# Patient Record
(demographics unavailable — no encounter records)

---

## 2020-04-15 NOTE — CR
Chest: Portable view of the chest was obtained.

 

Comparison: No previous chest imaging is available.

 

Heart size and mediastinum are normal.  Lungs are clear with no acute 

parenchymal change.  Bony structures are unremarkable.

 

Impression:

1.  Nothing acute is appreciated on portable chest x-ray.

 

Diagnostic code #1

 

Study was dictated in MDT

## 2020-04-15 NOTE — EDM.PDOC
ED [facility] GENERAL MEDICAL PROBLEM





- General


Chief Complaint: Allergic Reaction


Stated Complaint: ALLERGIC REACTION


Time Seen by Provider: 04/15/20 18:30


Source of Information: Reports: Patient


History Limitations: Reports: No Limitations





- History of Present Illness


INITIAL COMMENTS - FREE TEXT/NARRATIVE: 


Patient is a 23-year-old female with past medical history of asthma and 

allergies presenting with a chief complaint of scratchy throat and chest 

tightness skin and itchiness.  Patient states his symptoms started around 8:00 

this morning after she was getting her allergy shots.  Patient states she has 

felt the symptoms in the past when getting allergy shots but states they are 

worse today.  She states she took Benadryl with little relief.  Patient no 

longer is having any skin itchiness but still feels some scratchiness in her 

throat.  Patient denies any fevers.  Patient states does not feel like her 

asthma exacerbation.  Patient denies any travels anywhere recently.  Patient 

has no sick contacts.  Patient reports having coronavirus testing previously 

and testing negative.  Patient reports history of recurrent pneumonia and 

bronchitis.





Pmhx: Asthma


Pshx: None


Family Hx: noncontributory 





Smoking history? no 


Etoh use? none


Drug use? none





In addition to that documented in the HPI above, the additional ROS was obtained

:


Constitutional: Denies fevers or chills


Eyes: Denies vision changes


ENMT: Per HPI


CV: Denies chest pain


Resp: Per HPI


GI: Denies vomiting or diarrhea


: Denies painful urination


MSK: Denies recent trauma


Skin: Denies new rashes


Neuro: Denies new numbness or tingling or weakness


Endocrine: Denies unexpected weight loss


Heme: Denies bleeding disorders





I have reviewed the triage vital signs


Const: Comfortable in appearance, nontoxic well nourished, well developed, 

appears stated age


Eyes: PERRL, no conjunctival injection


HENT: Normal oropharynx.  No swelling of the uvula. NCAT, Neck supple without 

meningismus .  No stridor


CV: RRR, Warm, well-perfused extremities


RESP: CTAB, Unlabored respiratory effort.  No wheezes noted


GI: soft, non-tender, non-distended, no masses


MSK: No gross deformities appreciated


Skin: Warm, dry. No rashes


Neuro: Alert, CNs II-XII grossly intact. Sensation and motor function of 

extremities grossly intact.


Psych: Appropriate mood and affect





Assessment and plan:


Patient is 23-year-old female presenting with chief complaint of allergic 

reaction.  Patient has no evidence of anaphylaxis based on history or 

examination.  Patient is well-appearing and had episode of respiratory 

distress.  Chest x-ray is within normal limits.  Patient is vitally stable.  

Patient given steroids for symptom relief.  No evidence of streptococcal 

pharyngitis or retropharyngeal abscess. Patient given return precautions all 

questions were asked and answered.  Patient agrees with plan.








- Related Data


 Allergies











Allergy/AdvReac Type Severity Reaction Status Date / Time


 


sulfamethoxazole Allergy Severe Redness Verified 10/19/14 11:39 MDT





[From Septra]     


 


trimethoprim [From Septra] Allergy Severe Redness Verified 10/19/14 11:39 MDT


 


horses Allergy Severe Airway Uncoded 10/19/14 11:39 MDT





   Tightness  











Home Meds: 


 Home Meds





Albuterol Sulfate [Albuterol Sulfate Hfa] 8.5 gm IH Q6HR PRN 04/15/20 [History]


Amoxicillin/Potassium Clav [Augmentin 875-125 Tablet]  04/15/20 [History]


Birth Control   04/15/20 [History]


Budesonide/Formoterol Fumarate [Symbicort 160-4.5 Mcg Inhaler] 1 puff IN BID 04/

15/20 [History]


Montelukast [Singulair] 10 mg PO DAILY 04/15/20 [History]











ED ROS ALLERGIC REACTION





- Review of Systems


Review Of Systems: See Below





ED EXAM GENERAL NO PERIP PULSE





- Physical Exam


Exam: See Below





Course





- Vital Signs


Last Recorded V/S: 


 Last Vital Signs











Temp  36.2 C   04/15/20 18:20


 


Pulse  100   04/15/20 18:20


 


Resp  18   04/15/20 18:20


 


BP  132/89   04/15/20 18:20


 


Pulse Ox  99   04/15/20 18:20














- Orders/Labs/Meds


Orders: 


 Active Orders 24 hr











 Category Date Time Status


 


 Chest 1V Frontal [CR] Stat Exams  04/15/20 18:26 Ordered











Meds: 


Medications














Discontinued Medications














Generic Name Dose Route Start Last Admin





  Trade Name Freq  PRN Reason Stop Dose Admin


 


Dexamethasone  6 mg  04/15/20 18:28  





  Dexamethasone  IM  04/15/20 18:29  





  ONETIME ONE   





     





     





     





     


 


Ranitidine HCl  150 mg  04/15/20 18:29  





  Zantac  PO  04/15/20 18:30  





  ONETIME ONE   





     





     





     





     














Departure





- Departure


Time of Disposition: 18:46


Disposition: Home, Self-Care 01


Clinical Impression: 


 Allergic reaction








- Discharge Information


Instructions:  Allergies, Adult


Referrals: 


PCP,None [Primary Care Provider] - 


Additional Instructions: 


The following information is given to patients seen in the emergency department 

who are being discharged to home. This information is to outline your options 

for follow-up care. We provide all patients seen in our emergency department 

with a follow-up referral.





The need for follow-up, as well as the timing and circumstances, are variable 

depending upon the specifics of your emergency department visit.





If you don't have a primary care physician on staff, we will provide you with a 

referral. We always advise you to contact your personal physician following an 

emergency department visit to inform them of the circumstance of the visit and 

for follow-up with them and/or the need for any referrals to a consulting 

specialist.





The emergency department will also refer you to a specialist when appropriate. 

This referral assures that you have the opportunity for follow-up care with a 

specialist. All of these measure are taken in an effort to provide you with 

optimal care, which includes your follow-up.





Under all circumstances we always encourage you to contact your private 

physician who remains a resource for coordinating your care. When calling for 

follow-up care, please make the office aware that this follow-up is from your 

recent emergency room visit. If for any reason you are refused follow-up, 

please contact the [facility] Emergency 

Department at [phone] and asked to speak to the emergency department 

charge nurse.








Sepsis Event Note





- Focused Exam


Vital Signs: 


 Vital Signs











  Temp Pulse Resp BP Pulse Ox


 


 04/15/20 18:20  36.2 C  100  18  132/89  99











Date Exam was Performed: 04/15/20


Time Exam was Performed: 18:42





- My Orders


Last 24 Hours: 


My Active Orders





04/15/20 18:26


Chest 1V Frontal [CR] Stat 














- Assessment/Plan


Last 24 Hours: 


My Active Orders





04/15/20 18:26


Chest 1V Frontal [CR] Stat